# Patient Record
Sex: FEMALE | Race: WHITE
[De-identification: names, ages, dates, MRNs, and addresses within clinical notes are randomized per-mention and may not be internally consistent; named-entity substitution may affect disease eponyms.]

---

## 2019-01-16 ENCOUNTER — HOSPITAL ENCOUNTER (INPATIENT)
Dept: HOSPITAL 94 - ER | Age: 69
LOS: 5 days | Discharge: HOME | End: 2019-01-21
Payer: MEDICARE

## 2019-01-16 DIAGNOSIS — G93.41: ICD-10-CM

## 2019-01-16 DIAGNOSIS — E87.1: ICD-10-CM

## 2019-01-16 DIAGNOSIS — L03.116: Primary | ICD-10-CM

## 2019-02-16 ENCOUNTER — HOSPITAL ENCOUNTER (INPATIENT)
Dept: HOSPITAL 94 - ER | Age: 69
LOS: 6 days | Discharge: HOME | DRG: 640 | End: 2019-02-22
Attending: INTERNAL MEDICINE | Admitting: INTERNAL MEDICINE
Payer: MEDICARE

## 2019-02-16 VITALS — SYSTOLIC BLOOD PRESSURE: 112 MMHG | DIASTOLIC BLOOD PRESSURE: 70 MMHG

## 2019-02-16 VITALS — DIASTOLIC BLOOD PRESSURE: 52 MMHG | SYSTOLIC BLOOD PRESSURE: 91 MMHG

## 2019-02-16 VITALS — SYSTOLIC BLOOD PRESSURE: 95 MMHG | DIASTOLIC BLOOD PRESSURE: 50 MMHG

## 2019-02-16 VITALS — DIASTOLIC BLOOD PRESSURE: 55 MMHG | SYSTOLIC BLOOD PRESSURE: 91 MMHG

## 2019-02-16 VITALS — SYSTOLIC BLOOD PRESSURE: 90 MMHG | DIASTOLIC BLOOD PRESSURE: 56 MMHG

## 2019-02-16 VITALS — DIASTOLIC BLOOD PRESSURE: 78 MMHG | SYSTOLIC BLOOD PRESSURE: 117 MMHG

## 2019-02-16 VITALS — BODY MASS INDEX: 16.9 KG/M2 | HEIGHT: 61 IN | WEIGHT: 89.51 LBS

## 2019-02-16 VITALS — DIASTOLIC BLOOD PRESSURE: 56 MMHG | SYSTOLIC BLOOD PRESSURE: 89 MMHG

## 2019-02-16 DIAGNOSIS — G93.41: ICD-10-CM

## 2019-02-16 DIAGNOSIS — B19.20: ICD-10-CM

## 2019-02-16 DIAGNOSIS — Z79.890: ICD-10-CM

## 2019-02-16 DIAGNOSIS — E03.9: ICD-10-CM

## 2019-02-16 DIAGNOSIS — F50.9: ICD-10-CM

## 2019-02-16 DIAGNOSIS — E87.1: Primary | ICD-10-CM

## 2019-02-16 LAB
ALBUMIN SERPL BCP-MCNC: 3.1 G/DL (ref 3.4–5)
ALBUMIN SERPL BCP-MCNC: 3.5 G/DL (ref 3.4–5)
ALBUMIN/GLOB SERPL: 1.1 {RATIO} (ref 1.1–1.5)
ALP SERPL-CCNC: 68 IU/L (ref 46–116)
ALT SERPL W P-5'-P-CCNC: 151 U/L (ref 12–78)
AMPHETAMINES UR QL SCN: NEGATIVE
ANION GAP SERPL CALCULATED.3IONS-SCNC: 11 MMOL/L (ref 8–16)
ANION GAP SERPL CALCULATED.3IONS-SCNC: 12 MMOL/L (ref 8–16)
APAP SERPL-MCNC: < 2 UG/ML (ref 10–30)
APTT PPP: 32 SECONDS (ref 22–32)
AST SERPL W P-5'-P-CCNC: 572 U/L (ref 10–37)
BACTERIA URNS QL MICRO: (no result) /HPF
BARBITURATES UR QL SCN: NEGATIVE
BASOPHILS # BLD AUTO: 0 X10'3 (ref 0–0.2)
BASOPHILS NFR BLD AUTO: 1.2 % (ref 0–1)
BENZODIAZ UR QL SCN: NEGATIVE
BILIRUB SERPL-MCNC: 1.4 MG/DL (ref 0.1–1)
BUN SERPL-MCNC: 18 MG/DL (ref 7–18)
BUN SERPL-MCNC: 19 MG/DL (ref 7–18)
BUN/CREAT SERPL: 21.6 (ref 6.6–38)
BUN/CREAT SERPL: 22 (ref 6.6–38)
BURR CELLS BLD QL SMEAR: (no result)
BZE UR QL SCN: NEGATIVE
CALCIUM SERPL-MCNC: 8.3 MG/DL (ref 8.5–10.1)
CALCIUM SERPL-MCNC: 8.7 MG/DL (ref 8.5–10.1)
CANNABINOIDS UR QL SCN: NEGATIVE
CHLORIDE SERPL-SCNC: 72 MMOL/L (ref 99–107)
CHLORIDE SERPL-SCNC: 78 MMOL/L (ref 99–107)
CLARITY UR: (no result)
CO2 SERPL-SCNC: 22.1 MMOL/L (ref 24–32)
CO2 SERPL-SCNC: 22.4 MMOL/L (ref 24–32)
COLOR UR: YELLOW
CREAT SERPL-MCNC: 0.82 MG/DL (ref 0.4–0.9)
CREAT SERPL-MCNC: 0.88 MG/DL (ref 0.4–0.9)
DEPRECATED SQUAMOUS URNS QL MICRO: (no result) /LPF
EOSINOPHIL # BLD AUTO: 0 X10'3 (ref 0–0.9)
EOSINOPHIL NFR BLD AUTO: 0.1 % (ref 0–6)
ERYTHROCYTE [DISTWIDTH] IN BLOOD BY AUTOMATED COUNT: 11.8 % (ref 11.5–14.5)
ETHANOL SERPL-MCNC: < 0.01 GM/DL (ref 0–0.01)
GFR SERPL CREATININE-BSD FRML MDRD: 64 ML/MIN
GFR SERPL CREATININE-BSD FRML MDRD: 70 ML/MIN
GLUCOSE SERPL-MCNC: 117 MG/DL (ref 70–104)
GLUCOSE SERPL-MCNC: 77 MG/DL (ref 70–104)
GLUCOSE UR STRIP-MCNC: NEGATIVE MG/DL
HCT VFR BLD AUTO: 46.4 % (ref 35–45)
HGB BLD-MCNC: 15.1 G/DL (ref 12–16)
HGB UR QL STRIP: (no result)
INR PPP: 1.1 INR
KETONES UR STRIP-MCNC: (no result) MG/DL
LEUKOCYTE ESTERASE UR QL STRIP: (no result)
LYMPHOCYTES # BLD AUTO: 0.3 X10'3 (ref 1.1–4.8)
LYMPHOCYTES NFR BLD AUTO: 8 % (ref 21–51)
LYMPHOCYTES NFR BLD MANUAL: 8 % (ref 21–51)
MAGNESIUM SERPL-MCNC: 2.1 MG/DL (ref 1.5–2.4)
MCH RBC QN AUTO: 32 PG (ref 27–31)
MCHC RBC AUTO-ENTMCNC: 32.7 G/DL (ref 33–36.5)
MCV RBC AUTO: 97.8 FL (ref 78–98)
METHADONE UR QL SCN: NEGATIVE
MONOCYTES # BLD AUTO: 0.1 X10'3 (ref 0–0.9)
MONOCYTES NFR BLD AUTO: 2.2 % (ref 2–12)
MONOCYTES NFR BLD MANUAL: 3 % (ref 2–12)
NEUTROPHILS # BLD AUTO: 3.5 X10'3 (ref 1.8–7.7)
NEUTROPHILS NFR BLD AUTO: 88.5 % (ref 42–75)
NEUTS BAND # BLD MANUAL: 17 % (ref 0–10)
NEUTS SEG NFR BLD MANUAL: 72 % (ref 42–75)
NITRITE UR QL STRIP: NEGATIVE
OPIATES UR QL SCN: NEGATIVE
OSMOLALITY UR: 276 MOSM/K (ref 50–1400)
PCP UR QL SCN: NEGATIVE
PH UR STRIP: 6 [PH] (ref 4.8–8)
PLATELET # BLD AUTO: 173 X10'3 (ref 140–440)
PLATELET BLD QL SMEAR: NORMAL
PMV BLD AUTO: 8.6 FL (ref 7.4–10.4)
POTASSIUM SERPL-SCNC: 4.1 MMOL/L (ref 3.5–5.1)
POTASSIUM SERPL-SCNC: 4.2 MMOL/L (ref 3.5–5.1)
PROT SERPL-MCNC: 6.8 G/DL (ref 6.4–8.2)
PROT UR QL STRIP: 100 MG/DL
PROTHROMBIN TIME: 11 SECONDS (ref 9–12)
RBC # BLD AUTO: 4.74 X10'6 (ref 4.2–5.6)
RBC #/AREA URNS HPF: (no result) /HPF (ref 0–2)
RBC MORPH BLD: (no result)
SODIUM ?TM SUB UR QN: (no result) MEQ/L
SODIUM SERPL-SCNC: 105 MMOL/L (ref 135–145)
SODIUM SERPL-SCNC: 112 MMOL/L (ref 135–145)
SP GR UR STRIP: <=1.005 (ref 1–1.03)
TOTAL CELLS COUNTED FLD: 100
URN COLLECT METHOD CLASS: (no result)
UROBILINOGEN UR STRIP-MCNC: 0.2 E.U/DL (ref 0.2–1)
WBC # BLD AUTO: 3.9 X10'3 (ref 4.5–11)
WBC CLUMPS #/AREA URNS HPF: (no result) /HPF

## 2019-02-16 PROCEDURE — 85730 THROMBOPLASTIN TIME PARTIAL: CPT

## 2019-02-16 PROCEDURE — 83935 ASSAY OF URINE OSMOLALITY: CPT

## 2019-02-16 PROCEDURE — 82272 OCCULT BLD FECES 1-3 TESTS: CPT

## 2019-02-16 PROCEDURE — 80329 ANALGESICS NON-OPIOID 1 OR 2: CPT

## 2019-02-16 PROCEDURE — 70450 CT HEAD/BRAIN W/O DYE: CPT

## 2019-02-16 PROCEDURE — 85610 PROTHROMBIN TIME: CPT

## 2019-02-16 PROCEDURE — 87186 SC STD MICRODIL/AGAR DIL: CPT

## 2019-02-16 PROCEDURE — 80048 BASIC METABOLIC PNL TOTAL CA: CPT

## 2019-02-16 PROCEDURE — 85025 COMPLETE CBC W/AUTO DIFF WBC: CPT

## 2019-02-16 PROCEDURE — 87088 URINE BACTERIA CULTURE: CPT

## 2019-02-16 PROCEDURE — 36415 COLL VENOUS BLD VENIPUNCTURE: CPT

## 2019-02-16 PROCEDURE — 80320 DRUG SCREEN QUANTALCOHOLS: CPT

## 2019-02-16 PROCEDURE — 80053 COMPREHEN METABOLIC PANEL: CPT

## 2019-02-16 PROCEDURE — 83735 ASSAY OF MAGNESIUM: CPT

## 2019-02-16 PROCEDURE — 84443 ASSAY THYROID STIM HORMONE: CPT

## 2019-02-16 PROCEDURE — 94760 N-INVAS EAR/PLS OXIMETRY 1: CPT

## 2019-02-16 PROCEDURE — 71045 X-RAY EXAM CHEST 1 VIEW: CPT

## 2019-02-16 PROCEDURE — 93005 ELECTROCARDIOGRAM TRACING: CPT

## 2019-02-16 PROCEDURE — 87070 CULTURE OTHR SPECIMN AEROBIC: CPT

## 2019-02-16 PROCEDURE — 84300 ASSAY OF URINE SODIUM: CPT

## 2019-02-16 PROCEDURE — 81001 URINALYSIS AUTO W/SCOPE: CPT

## 2019-02-16 PROCEDURE — 80305 DRUG TEST PRSMV DIR OPT OBS: CPT

## 2019-02-16 PROCEDURE — 82948 REAGENT STRIP/BLOOD GLUCOSE: CPT

## 2019-02-16 PROCEDURE — 84100 ASSAY OF PHOSPHORUS: CPT

## 2019-02-16 PROCEDURE — 99291 CRITICAL CARE FIRST HOUR: CPT

## 2019-02-16 PROCEDURE — 87077 CULTURE AEROBIC IDENTIFY: CPT

## 2019-02-16 RX ADMIN — SODIUM CHLORIDE SCH MLS/HR: 3 INJECTION, SOLUTION INTRAVENOUS at 15:46

## 2019-02-16 RX ADMIN — SODIUM CHLORIDE SCH MLS/HR: 9 INJECTION INTRAMUSCULAR; INTRAVENOUS; SUBCUTANEOUS at 18:45

## 2019-02-16 RX ADMIN — HEPARIN SODIUM SCH UNIT: 5000 INJECTION, SOLUTION INTRAVENOUS; SUBCUTANEOUS at 21:41

## 2019-02-16 NOTE — NUR
Problems reprioritized. Patient report given, questions answered & plan of care reviewed 
with Kinga LANGE.

## 2019-02-16 NOTE — NUR
Patient arrived to floor via gurney and transferred to hospital bed. Patient appears guarded 
and denice her hands, alert to self and location.  Two RN skin check performed with 
Keturah Perdomo RN with multiple skin issues: Non-blanchable redness on sacrum, multiple skin 
tears, and ecchymosis throughout.  Wound care pictures taken and foam dressings placed.  IV 
synthroid not available; pharmacy to bring up.  Weller cath placed with no issues, 350ml 
immediately out and sample sent down to lab.  Right PIV infusing 3% NSS at 50ml/h. Patient 
oriented to room with  at bedside.

## 2019-02-16 NOTE — NUR
ATTEMPTED MULTIPLE EKG'S ON PATIENT IN RM 19, PT WAS SHAKY AND COULDN'T GET A 
GOOD READ, TOO MUCH ARTIFACT TO READ AN EKG, STILL PRINTED AN EKG AT 1312

## 2019-02-16 NOTE — NUR
MIKIE CHARGE NURSE GIVEN PT CHARTE AND INFORMED PT NEEDS TO COME STRAIGHT BACK 
AFTER EKG DONE IN BED 19

## 2019-02-16 NOTE — NUR
RN Note -MD Communication



 relating concerns regarding stool tests and and follow-up colonoscopy when Nhi Vieira arrived at bedside and he was able to relay his questions directly to her.

## 2019-02-17 VITALS — DIASTOLIC BLOOD PRESSURE: 61 MMHG | SYSTOLIC BLOOD PRESSURE: 102 MMHG

## 2019-02-17 VITALS — SYSTOLIC BLOOD PRESSURE: 81 MMHG | DIASTOLIC BLOOD PRESSURE: 52 MMHG

## 2019-02-17 VITALS — DIASTOLIC BLOOD PRESSURE: 51 MMHG | SYSTOLIC BLOOD PRESSURE: 89 MMHG

## 2019-02-17 VITALS — DIASTOLIC BLOOD PRESSURE: 48 MMHG | SYSTOLIC BLOOD PRESSURE: 83 MMHG

## 2019-02-17 VITALS — DIASTOLIC BLOOD PRESSURE: 62 MMHG | SYSTOLIC BLOOD PRESSURE: 105 MMHG

## 2019-02-17 VITALS — SYSTOLIC BLOOD PRESSURE: 96 MMHG | DIASTOLIC BLOOD PRESSURE: 54 MMHG

## 2019-02-17 VITALS — SYSTOLIC BLOOD PRESSURE: 95 MMHG | DIASTOLIC BLOOD PRESSURE: 47 MMHG

## 2019-02-17 VITALS — SYSTOLIC BLOOD PRESSURE: 85 MMHG | DIASTOLIC BLOOD PRESSURE: 50 MMHG

## 2019-02-17 VITALS — DIASTOLIC BLOOD PRESSURE: 50 MMHG | SYSTOLIC BLOOD PRESSURE: 81 MMHG

## 2019-02-17 VITALS — DIASTOLIC BLOOD PRESSURE: 64 MMHG | SYSTOLIC BLOOD PRESSURE: 104 MMHG

## 2019-02-17 VITALS — SYSTOLIC BLOOD PRESSURE: 80 MMHG | DIASTOLIC BLOOD PRESSURE: 44 MMHG

## 2019-02-17 VITALS — DIASTOLIC BLOOD PRESSURE: 58 MMHG | SYSTOLIC BLOOD PRESSURE: 98 MMHG

## 2019-02-17 VITALS — DIASTOLIC BLOOD PRESSURE: 53 MMHG | SYSTOLIC BLOOD PRESSURE: 94 MMHG

## 2019-02-17 VITALS — DIASTOLIC BLOOD PRESSURE: 68 MMHG | SYSTOLIC BLOOD PRESSURE: 111 MMHG

## 2019-02-17 VITALS — SYSTOLIC BLOOD PRESSURE: 90 MMHG | DIASTOLIC BLOOD PRESSURE: 50 MMHG

## 2019-02-17 VITALS — DIASTOLIC BLOOD PRESSURE: 71 MMHG | SYSTOLIC BLOOD PRESSURE: 105 MMHG

## 2019-02-17 VITALS — DIASTOLIC BLOOD PRESSURE: 57 MMHG | SYSTOLIC BLOOD PRESSURE: 87 MMHG

## 2019-02-17 VITALS — SYSTOLIC BLOOD PRESSURE: 99 MMHG | DIASTOLIC BLOOD PRESSURE: 59 MMHG

## 2019-02-17 VITALS — DIASTOLIC BLOOD PRESSURE: 56 MMHG | SYSTOLIC BLOOD PRESSURE: 100 MMHG

## 2019-02-17 VITALS — SYSTOLIC BLOOD PRESSURE: 107 MMHG | DIASTOLIC BLOOD PRESSURE: 76 MMHG

## 2019-02-17 VITALS — DIASTOLIC BLOOD PRESSURE: 51 MMHG | SYSTOLIC BLOOD PRESSURE: 98 MMHG

## 2019-02-17 VITALS — DIASTOLIC BLOOD PRESSURE: 63 MMHG | SYSTOLIC BLOOD PRESSURE: 103 MMHG

## 2019-02-17 LAB
ALBUMIN SERPL BCP-MCNC: 2.5 G/DL (ref 3.4–5)
ALBUMIN SERPL BCP-MCNC: 2.7 G/DL (ref 3.4–5)
ANION GAP SERPL CALCULATED.3IONS-SCNC: 10 MMOL/L (ref 8–16)
ANION GAP SERPL CALCULATED.3IONS-SCNC: 10 MMOL/L (ref 8–16)
ANION GAP SERPL CALCULATED.3IONS-SCNC: 12 MMOL/L (ref 8–16)
ANION GAP SERPL CALCULATED.3IONS-SCNC: 9 MMOL/L (ref 8–16)
BASOPHILS # BLD AUTO: 0.1 X10'3 (ref 0–0.2)
BASOPHILS NFR BLD AUTO: 1 % (ref 0–1)
BUN SERPL-MCNC: 16 MG/DL (ref 7–18)
BUN SERPL-MCNC: 16 MG/DL (ref 7–18)
BUN SERPL-MCNC: 17 MG/DL (ref 7–18)
BUN SERPL-MCNC: 17 MG/DL (ref 7–18)
BUN/CREAT SERPL: 23.9 (ref 6.6–38)
BUN/CREAT SERPL: 23.9 (ref 6.6–38)
BUN/CREAT SERPL: 27.1 (ref 6.6–38)
BUN/CREAT SERPL: 27.9 (ref 6.6–38)
CALCIUM SERPL-MCNC: 7.4 MG/DL (ref 8.5–10.1)
CALCIUM SERPL-MCNC: 7.7 MG/DL (ref 8.5–10.1)
CALCIUM SERPL-MCNC: 7.9 MG/DL (ref 8.5–10.1)
CALCIUM SERPL-MCNC: 7.9 MG/DL (ref 8.5–10.1)
CHLORIDE SERPL-SCNC: 82 MMOL/L (ref 99–107)
CHLORIDE SERPL-SCNC: 82 MMOL/L (ref 99–107)
CHLORIDE SERPL-SCNC: 84 MMOL/L (ref 99–107)
CHLORIDE SERPL-SCNC: 87 MMOL/L (ref 99–107)
CO2 SERPL-SCNC: 21.1 MMOL/L (ref 24–32)
CO2 SERPL-SCNC: 21.2 MMOL/L (ref 24–32)
CO2 SERPL-SCNC: 21.6 MMOL/L (ref 24–32)
CO2 SERPL-SCNC: 22.1 MMOL/L (ref 24–32)
CREAT SERPL-MCNC: 0.59 MG/DL (ref 0.4–0.9)
CREAT SERPL-MCNC: 0.61 MG/DL (ref 0.4–0.9)
CREAT SERPL-MCNC: 0.67 MG/DL (ref 0.4–0.9)
CREAT SERPL-MCNC: 0.71 MG/DL (ref 0.4–0.9)
EOSINOPHIL # BLD AUTO: 0 X10'3 (ref 0–0.9)
EOSINOPHIL NFR BLD AUTO: 0.1 % (ref 0–6)
ERYTHROCYTE [DISTWIDTH] IN BLOOD BY AUTOMATED COUNT: 12 % (ref 11.5–14.5)
GFR SERPL CREATININE-BSD FRML MDRD: 82 ML/MIN
GFR SERPL CREATININE-BSD FRML MDRD: 88 ML/MIN
GFR SERPL CREATININE-BSD FRML MDRD: > 90 ML/MIN
GFR SERPL CREATININE-BSD FRML MDRD: > 90 ML/MIN
GLUCOSE SERPL-MCNC: 69 MG/DL (ref 70–104)
GLUCOSE SERPL-MCNC: 71 MG/DL (ref 70–104)
GLUCOSE SERPL-MCNC: 72 MG/DL (ref 70–104)
GLUCOSE SERPL-MCNC: 76 MG/DL (ref 70–104)
HCT VFR BLD AUTO: 42.2 % (ref 35–45)
HGB BLD-MCNC: 14 G/DL (ref 12–16)
LYMPHOCYTES # BLD AUTO: 0.2 X10'3 (ref 1.1–4.8)
LYMPHOCYTES NFR BLD AUTO: 4.2 % (ref 21–51)
MCH RBC QN AUTO: 32.3 PG (ref 27–31)
MCHC RBC AUTO-ENTMCNC: 33.1 G/DL (ref 33–36.5)
MCV RBC AUTO: 97.5 FL (ref 78–98)
MONOCYTES # BLD AUTO: 0.1 X10'3 (ref 0–0.9)
MONOCYTES NFR BLD AUTO: 2 % (ref 2–12)
NEUTROPHILS # BLD AUTO: 4.9 X10'3 (ref 1.8–7.7)
NEUTROPHILS NFR BLD AUTO: 92.7 % (ref 42–75)
PLATELET # BLD AUTO: 144 X10'3 (ref 140–440)
PMV BLD AUTO: 9 FL (ref 7.4–10.4)
POTASSIUM SERPL-SCNC: 3.9 MMOL/L (ref 3.5–5.1)
POTASSIUM SERPL-SCNC: 4.2 MMOL/L (ref 3.5–5.1)
POTASSIUM SERPL-SCNC: 4.4 MMOL/L (ref 3.5–5.1)
POTASSIUM SERPL-SCNC: 4.6 MMOL/L (ref 3.5–5.1)
RBC # BLD AUTO: 4.33 X10'6 (ref 4.2–5.6)
SODIUM SERPL-SCNC: 113 MMOL/L (ref 135–145)
SODIUM SERPL-SCNC: 115 MMOL/L (ref 135–145)
SODIUM SERPL-SCNC: 115 MMOL/L (ref 135–145)
SODIUM SERPL-SCNC: 119 MMOL/L (ref 135–145)
WBC # BLD AUTO: 5.3 X10'3 (ref 4.5–11)

## 2019-02-17 RX ADMIN — SIMETHICONE SCH MG: 20 SUSPENSION/ DROPS ORAL at 08:00

## 2019-02-17 RX ADMIN — HEPARIN SODIUM SCH UNIT: 5000 INJECTION, SOLUTION INTRAVENOUS; SUBCUTANEOUS at 08:32

## 2019-02-17 RX ADMIN — SODIUM CHLORIDE SCH MLS/HR: 3 INJECTION, SOLUTION INTRAVENOUS at 11:20

## 2019-02-17 RX ADMIN — SIMETHICONE SCH MG: 20 SUSPENSION/ DROPS ORAL at 13:00

## 2019-02-17 RX ADMIN — SIMETHICONE SCH MG: 20 SUSPENSION/ DROPS ORAL at 21:00

## 2019-02-17 RX ADMIN — HEPARIN SODIUM SCH UNIT: 5000 INJECTION, SOLUTION INTRAVENOUS; SUBCUTANEOUS at 20:51

## 2019-02-17 RX ADMIN — SODIUM CHLORIDE SCH MLS/HR: 3 INJECTION, SOLUTION INTRAVENOUS at 01:20

## 2019-02-17 RX ADMIN — SODIUM CHLORIDE SCH MLS/HR: 9 INJECTION INTRAMUSCULAR; INTRAVENOUS; SUBCUTANEOUS at 11:01

## 2019-02-17 RX ADMIN — PANTOPRAZOLE SODIUM SCH MG: 40 TABLET, DELAYED RELEASE ORAL at 07:30

## 2019-02-17 NOTE — NUR
RN Note -Appetite/education



Have tried to explain to pt that she needs to decrease her water intake or she will continue 
to lose sodium and end up back in the hospital. Also explained to pt that she needs to eat. 
Pt said that she is nauseous. Gave Zofran. Pt still seemed overall reluctant to eat and only 
wanted water. Told pt that she could have a glass of water if she ate some food. She did 
finally eat less than 25% of her dinner. 



Sent down meal request for tomorrow that included foods she and her  said she likes.

## 2019-02-17 NOTE — NUR
Patient in room ICU 2040. I have received report from  NAZARIO Nolasco and had the opportunity to 
ask questions and assume patient care.

## 2019-02-17 NOTE — NUR
RN Note -MD Communication



Called Chance Cheung regarding simethicone home med reported by . Order received and 
entered in EMR.

## 2019-02-17 NOTE — NUR
Problems reprioritized. Patient report given, questions answered & plan of care reviewed 
with Gaurav RN.

## 2019-02-18 VITALS — DIASTOLIC BLOOD PRESSURE: 75 MMHG | SYSTOLIC BLOOD PRESSURE: 148 MMHG

## 2019-02-18 VITALS — SYSTOLIC BLOOD PRESSURE: 109 MMHG | DIASTOLIC BLOOD PRESSURE: 60 MMHG

## 2019-02-18 VITALS — SYSTOLIC BLOOD PRESSURE: 116 MMHG | DIASTOLIC BLOOD PRESSURE: 80 MMHG

## 2019-02-18 VITALS — DIASTOLIC BLOOD PRESSURE: 58 MMHG | SYSTOLIC BLOOD PRESSURE: 106 MMHG

## 2019-02-18 VITALS — SYSTOLIC BLOOD PRESSURE: 124 MMHG | DIASTOLIC BLOOD PRESSURE: 66 MMHG

## 2019-02-18 VITALS — SYSTOLIC BLOOD PRESSURE: 142 MMHG | DIASTOLIC BLOOD PRESSURE: 74 MMHG

## 2019-02-18 VITALS — SYSTOLIC BLOOD PRESSURE: 118 MMHG | DIASTOLIC BLOOD PRESSURE: 68 MMHG

## 2019-02-18 VITALS — DIASTOLIC BLOOD PRESSURE: 64 MMHG | SYSTOLIC BLOOD PRESSURE: 133 MMHG

## 2019-02-18 VITALS — SYSTOLIC BLOOD PRESSURE: 147 MMHG | DIASTOLIC BLOOD PRESSURE: 75 MMHG

## 2019-02-18 VITALS — SYSTOLIC BLOOD PRESSURE: 131 MMHG | DIASTOLIC BLOOD PRESSURE: 62 MMHG

## 2019-02-18 VITALS — SYSTOLIC BLOOD PRESSURE: 112 MMHG | DIASTOLIC BLOOD PRESSURE: 58 MMHG

## 2019-02-18 VITALS — SYSTOLIC BLOOD PRESSURE: 102 MMHG | DIASTOLIC BLOOD PRESSURE: 54 MMHG

## 2019-02-18 VITALS — SYSTOLIC BLOOD PRESSURE: 116 MMHG | DIASTOLIC BLOOD PRESSURE: 66 MMHG

## 2019-02-18 VITALS — DIASTOLIC BLOOD PRESSURE: 75 MMHG | SYSTOLIC BLOOD PRESSURE: 159 MMHG

## 2019-02-18 VITALS — DIASTOLIC BLOOD PRESSURE: 56 MMHG | SYSTOLIC BLOOD PRESSURE: 110 MMHG

## 2019-02-18 VITALS — DIASTOLIC BLOOD PRESSURE: 74 MMHG | SYSTOLIC BLOOD PRESSURE: 139 MMHG

## 2019-02-18 VITALS — DIASTOLIC BLOOD PRESSURE: 60 MMHG | SYSTOLIC BLOOD PRESSURE: 136 MMHG

## 2019-02-18 VITALS — DIASTOLIC BLOOD PRESSURE: 60 MMHG | SYSTOLIC BLOOD PRESSURE: 116 MMHG

## 2019-02-18 VITALS — DIASTOLIC BLOOD PRESSURE: 65 MMHG | SYSTOLIC BLOOD PRESSURE: 124 MMHG

## 2019-02-18 VITALS — SYSTOLIC BLOOD PRESSURE: 115 MMHG | DIASTOLIC BLOOD PRESSURE: 74 MMHG

## 2019-02-18 VITALS — DIASTOLIC BLOOD PRESSURE: 76 MMHG | SYSTOLIC BLOOD PRESSURE: 151 MMHG

## 2019-02-18 VITALS — SYSTOLIC BLOOD PRESSURE: 146 MMHG | DIASTOLIC BLOOD PRESSURE: 76 MMHG

## 2019-02-18 VITALS — SYSTOLIC BLOOD PRESSURE: 107 MMHG | DIASTOLIC BLOOD PRESSURE: 64 MMHG

## 2019-02-18 VITALS — SYSTOLIC BLOOD PRESSURE: 143 MMHG | DIASTOLIC BLOOD PRESSURE: 77 MMHG

## 2019-02-18 LAB
ALBUMIN SERPL BCP-MCNC: 2.1 G/DL (ref 3.4–5)
ALBUMIN SERPL BCP-MCNC: 2.3 G/DL (ref 3.4–5)
ALBUMIN SERPL BCP-MCNC: 2.5 G/DL (ref 3.4–5)
ALBUMIN SERPL BCP-MCNC: 2.6 G/DL (ref 3.4–5)
ALBUMIN SERPL BCP-MCNC: 2.8 G/DL (ref 3.4–5)
ALBUMIN/GLOB SERPL: 0.9 {RATIO} (ref 1.1–1.5)
ALP SERPL-CCNC: 62 IU/L (ref 46–116)
ALT SERPL W P-5'-P-CCNC: 194 U/L (ref 12–78)
ANION GAP SERPL CALCULATED.3IONS-SCNC: 11 MMOL/L (ref 8–16)
ANION GAP SERPL CALCULATED.3IONS-SCNC: 8 MMOL/L (ref 8–16)
ANION GAP SERPL CALCULATED.3IONS-SCNC: 9 MMOL/L (ref 8–16)
AST SERPL W P-5'-P-CCNC: 623 U/L (ref 10–37)
BASOPHILS # BLD AUTO: 0.1 X10'3 (ref 0–0.2)
BASOPHILS NFR BLD AUTO: 1.3 % (ref 0–1)
BILIRUB SERPL-MCNC: 0.9 MG/DL (ref 0.1–1)
BUN SERPL-MCNC: 15 MG/DL (ref 7–18)
BUN SERPL-MCNC: 16 MG/DL (ref 7–18)
BUN SERPL-MCNC: 17 MG/DL (ref 7–18)
BUN/CREAT SERPL: 26.2 (ref 6.6–38)
BUN/CREAT SERPL: 27.1 (ref 6.6–38)
BUN/CREAT SERPL: 29.4 (ref 6.6–38)
BUN/CREAT SERPL: 29.8 (ref 6.6–38)
BUN/CREAT SERPL: 31.4 (ref 6.6–38)
CALCIUM SERPL-MCNC: 6.8 MG/DL (ref 8.5–10.1)
CALCIUM SERPL-MCNC: 7.6 MG/DL (ref 8.5–10.1)
CALCIUM SERPL-MCNC: 7.9 MG/DL (ref 8.5–10.1)
CALCIUM SERPL-MCNC: 8 MG/DL (ref 8.5–10.1)
CALCIUM SERPL-MCNC: 8.3 MG/DL (ref 8.5–10.1)
CHLORIDE SERPL-SCNC: 91 MMOL/L (ref 99–107)
CHLORIDE SERPL-SCNC: 92 MMOL/L (ref 99–107)
CHLORIDE SERPL-SCNC: 93 MMOL/L (ref 99–107)
CHLORIDE SERPL-SCNC: 95 MMOL/L (ref 99–107)
CHLORIDE SERPL-SCNC: 95 MMOL/L (ref 99–107)
CO2 SERPL-SCNC: 17.7 MMOL/L (ref 24–32)
CO2 SERPL-SCNC: 18.6 MMOL/L (ref 24–32)
CO2 SERPL-SCNC: 22 MMOL/L (ref 24–32)
CO2 SERPL-SCNC: 23.1 MMOL/L (ref 24–32)
CO2 SERPL-SCNC: 23.5 MMOL/L (ref 24–32)
CREAT SERPL-MCNC: 0.51 MG/DL (ref 0.4–0.9)
CREAT SERPL-MCNC: 0.51 MG/DL (ref 0.4–0.9)
CREAT SERPL-MCNC: 0.57 MG/DL (ref 0.4–0.9)
CREAT SERPL-MCNC: 0.59 MG/DL (ref 0.4–0.9)
CREAT SERPL-MCNC: 0.61 MG/DL (ref 0.4–0.9)
EOSINOPHIL # BLD AUTO: 0 X10'3 (ref 0–0.9)
EOSINOPHIL NFR BLD AUTO: 0.1 % (ref 0–6)
EOSINOPHIL NFR BLD MANUAL: 1 % (ref 0–6)
ERYTHROCYTE [DISTWIDTH] IN BLOOD BY AUTOMATED COUNT: 12.1 % (ref 11.5–14.5)
GFR SERPL CREATININE-BSD FRML MDRD: > 90 ML/MIN
GLUCOSE SERPL-MCNC: 142 MG/DL (ref 70–104)
GLUCOSE SERPL-MCNC: 56 MG/DL (ref 70–104)
GLUCOSE SERPL-MCNC: 62 MG/DL (ref 70–104)
GLUCOSE SERPL-MCNC: 79 MG/DL (ref 70–104)
GLUCOSE SERPL-MCNC: 89 MG/DL (ref 70–104)
HCT VFR BLD AUTO: 37.3 % (ref 35–45)
HGB BLD-MCNC: 12.8 G/DL (ref 12–16)
LYMPHOCYTES # BLD AUTO: 0.5 X10'3 (ref 1.1–4.8)
LYMPHOCYTES NFR BLD AUTO: 7 % (ref 21–51)
LYMPHOCYTES NFR BLD MANUAL: 11 % (ref 21–51)
MAGNESIUM SERPL-MCNC: 2 MG/DL (ref 1.5–2.4)
MCH RBC QN AUTO: 33.4 PG (ref 27–31)
MCHC RBC AUTO-ENTMCNC: 34.3 G/DL (ref 33–36.5)
MCV RBC AUTO: 97.4 FL (ref 78–98)
MONOCYTES # BLD AUTO: 0.3 X10'3 (ref 0–0.9)
MONOCYTES NFR BLD AUTO: 4.5 % (ref 2–12)
MONOCYTES NFR BLD MANUAL: 5 % (ref 2–12)
NEUTROPHILS # BLD AUTO: 5.5 X10'3 (ref 1.8–7.7)
NEUTROPHILS NFR BLD AUTO: 87.1 % (ref 42–75)
NEUTS BAND # BLD MANUAL: 10 % (ref 0–10)
NEUTS SEG NFR BLD MANUAL: 73 % (ref 42–75)
OSMOLALITY UR: 192 MOSM/K (ref 50–1400)
PHOSPHATE SERPL-MCNC: 2.3 MG/DL (ref 2.3–4.5)
PLATELET # BLD AUTO: 142 X10'3 (ref 140–440)
PLATELET BLD QL SMEAR: (no result)
PMV BLD AUTO: 9.9 FL (ref 7.4–10.4)
POTASSIUM SERPL-SCNC: 3.5 MMOL/L (ref 3.5–5.1)
POTASSIUM SERPL-SCNC: 3.6 MMOL/L (ref 3.5–5.1)
POTASSIUM SERPL-SCNC: 3.9 MMOL/L (ref 3.5–5.1)
POTASSIUM SERPL-SCNC: 4 MMOL/L (ref 3.5–5.1)
POTASSIUM SERPL-SCNC: 4.2 MMOL/L (ref 3.5–5.1)
PROT SERPL-MCNC: 6 G/DL (ref 6.4–8.2)
RBC # BLD AUTO: 3.83 X10'6 (ref 4.2–5.6)
RBC MORPH BLD: NORMAL
SODIUM ?TM SUB UR QN: < 15 MEQ/L
SODIUM SERPL-SCNC: 123 MMOL/L (ref 135–145)
SODIUM SERPL-SCNC: 124 MMOL/L (ref 135–145)
SODIUM SERPL-SCNC: 124 MMOL/L (ref 135–145)
TOTAL CELLS COUNTED FLD: 100
WBC # BLD AUTO: 6.4 X10'3 (ref 4.5–11)

## 2019-02-18 RX ADMIN — SIMETHICONE SCH MG: 20 SUSPENSION/ DROPS ORAL at 07:47

## 2019-02-18 RX ADMIN — SIMETHICONE SCH MG: 20 SUSPENSION/ DROPS ORAL at 21:11

## 2019-02-18 RX ADMIN — HEPARIN SODIUM SCH UNIT: 5000 INJECTION, SOLUTION INTRAVENOUS; SUBCUTANEOUS at 21:10

## 2019-02-18 RX ADMIN — PANTOPRAZOLE SODIUM SCH MG: 40 TABLET, DELAYED RELEASE ORAL at 07:45

## 2019-02-18 RX ADMIN — LEVOTHYROXINE SODIUM SCH MCG: 100 TABLET ORAL at 07:45

## 2019-02-18 RX ADMIN — SODIUM CHLORIDE SCH MLS/HR: 9 INJECTION INTRAMUSCULAR; INTRAVENOUS; SUBCUTANEOUS at 00:35

## 2019-02-18 RX ADMIN — Medication PRN G: at 17:32

## 2019-02-18 RX ADMIN — HEPARIN SODIUM SCH UNIT: 5000 INJECTION, SOLUTION INTRAVENOUS; SUBCUTANEOUS at 07:46

## 2019-02-18 RX ADMIN — Medication SCH MMU: at 21:11

## 2019-02-18 RX ADMIN — SIMETHICONE SCH MG: 20 SUSPENSION/ DROPS ORAL at 13:39

## 2019-02-18 NOTE — NUR
Patient in room ICU 2040. I have received report from  [facility]  and had the opportunity to ask 
questions and assume patient care.

## 2019-02-18 NOTE — NUR
Patient in room ICU 2040. I have received report from [facility] and had the opportunity to ask 
questions and assume patient care.

## 2019-02-18 NOTE — NUR
Prolonged QT 0.68, Accu check this am 44, amp D5 given for a corrected BGL of 125, Na 124. 
alerted Dr Hernandez of changes. Specific order in place to alert MD if Na level >3 from 
previous level. Changed IV fluid to 1/2 NS. Patient confused, repeatedly asking when she can 
go home.  now at bedside.

## 2019-02-18 NOTE — NUR
RN Note -MD Communication



Notified Chance Cheung of drop in pt's urine output and increase in peripheral edema and 
blood pressure.



Also asked for order for  consult and psych eval because pt's low sodium and 
malnourishment appears to be caused by pt refusing to eat and compulsive water intake, which 
 is unable to control. Pt education is completely ineffective, as pt is irrational 
and either unable or unwilling to change her behavior. Review of pt previous admissions 
shows that current problem appears to have started last year when pt's mother  and she 
became depressed. Pt also told day shift RN that she had been diagnosed with eating disorder 
in childhood.

## 2019-02-19 VITALS — SYSTOLIC BLOOD PRESSURE: 138 MMHG | DIASTOLIC BLOOD PRESSURE: 70 MMHG

## 2019-02-19 VITALS — DIASTOLIC BLOOD PRESSURE: 73 MMHG | SYSTOLIC BLOOD PRESSURE: 143 MMHG

## 2019-02-19 VITALS — DIASTOLIC BLOOD PRESSURE: 78 MMHG | SYSTOLIC BLOOD PRESSURE: 152 MMHG

## 2019-02-19 VITALS — DIASTOLIC BLOOD PRESSURE: 73 MMHG | SYSTOLIC BLOOD PRESSURE: 136 MMHG

## 2019-02-19 VITALS — SYSTOLIC BLOOD PRESSURE: 155 MMHG | DIASTOLIC BLOOD PRESSURE: 83 MMHG

## 2019-02-19 VITALS — DIASTOLIC BLOOD PRESSURE: 63 MMHG | SYSTOLIC BLOOD PRESSURE: 121 MMHG

## 2019-02-19 VITALS — DIASTOLIC BLOOD PRESSURE: 74 MMHG | SYSTOLIC BLOOD PRESSURE: 144 MMHG

## 2019-02-19 VITALS — SYSTOLIC BLOOD PRESSURE: 132 MMHG | DIASTOLIC BLOOD PRESSURE: 70 MMHG

## 2019-02-19 VITALS — DIASTOLIC BLOOD PRESSURE: 82 MMHG | SYSTOLIC BLOOD PRESSURE: 159 MMHG

## 2019-02-19 VITALS — DIASTOLIC BLOOD PRESSURE: 85 MMHG | SYSTOLIC BLOOD PRESSURE: 163 MMHG

## 2019-02-19 VITALS — SYSTOLIC BLOOD PRESSURE: 149 MMHG | DIASTOLIC BLOOD PRESSURE: 70 MMHG

## 2019-02-19 VITALS — SYSTOLIC BLOOD PRESSURE: 161 MMHG | DIASTOLIC BLOOD PRESSURE: 71 MMHG

## 2019-02-19 VITALS — DIASTOLIC BLOOD PRESSURE: 66 MMHG | SYSTOLIC BLOOD PRESSURE: 140 MMHG

## 2019-02-19 VITALS — SYSTOLIC BLOOD PRESSURE: 133 MMHG | DIASTOLIC BLOOD PRESSURE: 64 MMHG

## 2019-02-19 VITALS — DIASTOLIC BLOOD PRESSURE: 79 MMHG | SYSTOLIC BLOOD PRESSURE: 145 MMHG

## 2019-02-19 VITALS — DIASTOLIC BLOOD PRESSURE: 70 MMHG | SYSTOLIC BLOOD PRESSURE: 134 MMHG

## 2019-02-19 VITALS — DIASTOLIC BLOOD PRESSURE: 81 MMHG | SYSTOLIC BLOOD PRESSURE: 165 MMHG

## 2019-02-19 VITALS — SYSTOLIC BLOOD PRESSURE: 142 MMHG | DIASTOLIC BLOOD PRESSURE: 76 MMHG

## 2019-02-19 VITALS — DIASTOLIC BLOOD PRESSURE: 73 MMHG | SYSTOLIC BLOOD PRESSURE: 139 MMHG

## 2019-02-19 LAB
ALBUMIN SERPL BCP-MCNC: 2.5 G/DL (ref 3.4–5)
ALBUMIN SERPL BCP-MCNC: 2.7 G/DL (ref 3.4–5)
ALBUMIN SERPL BCP-MCNC: 2.8 G/DL (ref 3.4–5)
ALBUMIN SERPL BCP-MCNC: 2.8 G/DL (ref 3.4–5)
ANION GAP SERPL CALCULATED.3IONS-SCNC: 10 MMOL/L (ref 8–16)
ANION GAP SERPL CALCULATED.3IONS-SCNC: 10 MMOL/L (ref 8–16)
ANION GAP SERPL CALCULATED.3IONS-SCNC: 7 MMOL/L (ref 8–16)
ANION GAP SERPL CALCULATED.3IONS-SCNC: 7 MMOL/L (ref 8–16)
BASOPHILS # BLD AUTO: 0 X10'3 (ref 0–0.2)
BASOPHILS NFR BLD AUTO: 0.2 % (ref 0–1)
BUN SERPL-MCNC: 12 MG/DL (ref 7–18)
BUN SERPL-MCNC: 12 MG/DL (ref 7–18)
BUN SERPL-MCNC: 13 MG/DL (ref 7–18)
BUN SERPL-MCNC: 13 MG/DL (ref 7–18)
BUN/CREAT SERPL: 25.5 (ref 6.6–38)
BUN/CREAT SERPL: 26.5 (ref 6.6–38)
BUN/CREAT SERPL: 27.3 (ref 6.6–38)
BUN/CREAT SERPL: 28.9 (ref 6.6–38)
CALCIUM SERPL-MCNC: 7.8 MG/DL (ref 8.5–10.1)
CALCIUM SERPL-MCNC: 7.9 MG/DL (ref 8.5–10.1)
CALCIUM SERPL-MCNC: 8.1 MG/DL (ref 8.5–10.1)
CALCIUM SERPL-MCNC: 8.1 MG/DL (ref 8.5–10.1)
CHLORIDE SERPL-SCNC: 93 MMOL/L (ref 99–107)
CHLORIDE SERPL-SCNC: 95 MMOL/L (ref 99–107)
CHLORIDE SERPL-SCNC: 96 MMOL/L (ref 99–107)
CHLORIDE SERPL-SCNC: 98 MMOL/L (ref 99–107)
CO2 SERPL-SCNC: 22.6 MMOL/L (ref 24–32)
CO2 SERPL-SCNC: 22.6 MMOL/L (ref 24–32)
CO2 SERPL-SCNC: 23.9 MMOL/L (ref 24–32)
CO2 SERPL-SCNC: 24.6 MMOL/L (ref 24–32)
CREAT SERPL-MCNC: 0.44 MG/DL (ref 0.4–0.9)
CREAT SERPL-MCNC: 0.45 MG/DL (ref 0.4–0.9)
CREAT SERPL-MCNC: 0.47 MG/DL (ref 0.4–0.9)
CREAT SERPL-MCNC: 0.49 MG/DL (ref 0.4–0.9)
EOSINOPHIL # BLD AUTO: 0.1 X10'3 (ref 0–0.9)
EOSINOPHIL NFR BLD AUTO: 1.1 % (ref 0–6)
ERYTHROCYTE [DISTWIDTH] IN BLOOD BY AUTOMATED COUNT: 13.3 % (ref 11.5–14.5)
GFR SERPL CREATININE-BSD FRML MDRD: > 90 ML/MIN
GLUCOSE SERPL-MCNC: 101 MG/DL (ref 70–104)
GLUCOSE SERPL-MCNC: 75 MG/DL (ref 70–104)
GLUCOSE SERPL-MCNC: 85 MG/DL (ref 70–104)
GLUCOSE SERPL-MCNC: 88 MG/DL (ref 70–104)
HCT VFR BLD AUTO: 38 % (ref 35–45)
HGB BLD-MCNC: 12.9 G/DL (ref 12–16)
LYMPHOCYTES # BLD AUTO: 0.5 X10'3 (ref 1.1–4.8)
LYMPHOCYTES NFR BLD AUTO: 8.8 % (ref 21–51)
MCH RBC QN AUTO: 32.1 PG (ref 27–31)
MCHC RBC AUTO-ENTMCNC: 33.9 G/DL (ref 33–36.5)
MCV RBC AUTO: 94.7 FL (ref 78–98)
MONOCYTES # BLD AUTO: 0.2 X10'3 (ref 0–0.9)
MONOCYTES NFR BLD AUTO: 3.7 % (ref 2–12)
NEUTROPHILS # BLD AUTO: 5 X10'3 (ref 1.8–7.7)
NEUTROPHILS NFR BLD AUTO: 86.2 % (ref 42–75)
PLATELET # BLD AUTO: 138 X10'3 (ref 140–440)
PMV BLD AUTO: 9.1 FL (ref 7.4–10.4)
POTASSIUM SERPL-SCNC: 3.5 MMOL/L (ref 3.5–5.1)
POTASSIUM SERPL-SCNC: 3.7 MMOL/L (ref 3.5–5.1)
POTASSIUM SERPL-SCNC: 3.8 MMOL/L (ref 3.5–5.1)
POTASSIUM SERPL-SCNC: 3.9 MMOL/L (ref 3.5–5.1)
RBC # BLD AUTO: 4.01 X10'6 (ref 4.2–5.6)
SODIUM SERPL-SCNC: 126 MMOL/L (ref 135–145)
SODIUM SERPL-SCNC: 128 MMOL/L (ref 135–145)
SODIUM SERPL-SCNC: 128 MMOL/L (ref 135–145)
SODIUM SERPL-SCNC: 129 MMOL/L (ref 135–145)
WBC # BLD AUTO: 5.8 X10'3 (ref 4.5–11)

## 2019-02-19 RX ADMIN — Medication SCH MMU: at 07:55

## 2019-02-19 RX ADMIN — HEPARIN SODIUM SCH UNIT: 5000 INJECTION, SOLUTION INTRAVENOUS; SUBCUTANEOUS at 20:12

## 2019-02-19 RX ADMIN — SIMETHICONE SCH MG: 20 SUSPENSION/ DROPS ORAL at 20:12

## 2019-02-19 RX ADMIN — Medication SCH MMU: at 20:08

## 2019-02-19 RX ADMIN — PANTOPRAZOLE SODIUM SCH MG: 40 TABLET, DELAYED RELEASE ORAL at 07:55

## 2019-02-19 RX ADMIN — LEVOTHYROXINE SODIUM SCH MCG: 100 TABLET ORAL at 07:55

## 2019-02-19 RX ADMIN — SIMETHICONE SCH MG: 20 SUSPENSION/ DROPS ORAL at 16:56

## 2019-02-19 RX ADMIN — SIMETHICONE SCH MG: 20 SUSPENSION/ DROPS ORAL at 07:57

## 2019-02-19 RX ADMIN — HEPARIN SODIUM SCH UNIT: 5000 INJECTION, SOLUTION INTRAVENOUS; SUBCUTANEOUS at 07:56

## 2019-02-19 NOTE — NUR
Problems reprioritized. Patient report given, questions answered & plan of care reviewed 
with NAZARIO Lopez.

## 2019-02-19 NOTE — NUR
Pt arrived on unit with ICU RN Amanuel. Pt was transferred to bed and had foam dressing applied 
to coccyx. Amanuel RN called report at 1725, was able to ask questions before patient arrived on 
unit. Will continue to monitor.

## 2019-02-19 NOTE — NUR
Called Dr. Hernandez with current lab report and no changes were ordered. I will continue to 
monitor with [facility] labs.

## 2019-02-19 NOTE — NUR
Patient in room PCU 3014. I have received report from NAZARIO Lopez and had the opportunity to 
ask questions and assume patient care. Woke patient up as dinner has arrived, she is fluid 
restricted to 2L a day due to electrolyte imbalance due to excessive water intake and 
anorexia. I sat up the patient's dinner tray and will continue to monitor.

## 2019-02-19 NOTE — NUR
Problems reprioritized. Patient report given, questions answered & plan of care reviewed 
with Tanya LANGE.Patient stable at transfer of care.

## 2019-02-19 NOTE — NUR
Problems reprioritized. Patient report given, questions answered & plan of care reviewed 
with geneva San.

## 2019-02-20 VITALS — DIASTOLIC BLOOD PRESSURE: 67 MMHG | SYSTOLIC BLOOD PRESSURE: 124 MMHG

## 2019-02-20 VITALS — DIASTOLIC BLOOD PRESSURE: 92 MMHG | SYSTOLIC BLOOD PRESSURE: 146 MMHG

## 2019-02-20 VITALS — SYSTOLIC BLOOD PRESSURE: 156 MMHG | DIASTOLIC BLOOD PRESSURE: 87 MMHG

## 2019-02-20 VITALS — DIASTOLIC BLOOD PRESSURE: 68 MMHG | SYSTOLIC BLOOD PRESSURE: 126 MMHG

## 2019-02-20 VITALS — DIASTOLIC BLOOD PRESSURE: 64 MMHG | SYSTOLIC BLOOD PRESSURE: 106 MMHG

## 2019-02-20 LAB
ALBUMIN SERPL BCP-MCNC: 2.5 G/DL (ref 3.4–5)
ALBUMIN SERPL BCP-MCNC: 2.6 G/DL (ref 3.4–5)
ALBUMIN SERPL BCP-MCNC: 2.6 G/DL (ref 3.4–5)
ALBUMIN SERPL BCP-MCNC: 2.8 G/DL (ref 3.4–5)
ANION GAP SERPL CALCULATED.3IONS-SCNC: 6 MMOL/L (ref 8–16)
ANION GAP SERPL CALCULATED.3IONS-SCNC: 7 MMOL/L (ref 8–16)
ANION GAP SERPL CALCULATED.3IONS-SCNC: 7 MMOL/L (ref 8–16)
ANION GAP SERPL CALCULATED.3IONS-SCNC: 8 MMOL/L (ref 8–16)
BASOPHILS # BLD AUTO: 0 X10'3 (ref 0–0.2)
BASOPHILS NFR BLD AUTO: 0.5 % (ref 0–1)
BUN SERPL-MCNC: 11 MG/DL (ref 7–18)
BUN SERPL-MCNC: 11 MG/DL (ref 7–18)
BUN SERPL-MCNC: 12 MG/DL (ref 7–18)
BUN SERPL-MCNC: 14 MG/DL (ref 7–18)
BUN/CREAT SERPL: 23.4 (ref 6.6–38)
BUN/CREAT SERPL: 24.4 (ref 6.6–38)
BUN/CREAT SERPL: 26.7 (ref 6.6–38)
BUN/CREAT SERPL: 31.1 (ref 6.6–38)
CALCIUM SERPL-MCNC: 8 MG/DL (ref 8.5–10.1)
CALCIUM SERPL-MCNC: 8.3 MG/DL (ref 8.5–10.1)
CHLORIDE SERPL-SCNC: 93 MMOL/L (ref 99–107)
CHLORIDE SERPL-SCNC: 95 MMOL/L (ref 99–107)
CHLORIDE SERPL-SCNC: 95 MMOL/L (ref 99–107)
CHLORIDE SERPL-SCNC: 97 MMOL/L (ref 99–107)
CO2 SERPL-SCNC: 24.2 MMOL/L (ref 24–32)
CO2 SERPL-SCNC: 25.5 MMOL/L (ref 24–32)
CO2 SERPL-SCNC: 26.2 MMOL/L (ref 24–32)
CO2 SERPL-SCNC: 26.3 MMOL/L (ref 24–32)
CREAT SERPL-MCNC: 0.45 MG/DL (ref 0.4–0.9)
CREAT SERPL-MCNC: 0.47 MG/DL (ref 0.4–0.9)
EOSINOPHIL # BLD AUTO: 0.1 X10'3 (ref 0–0.9)
EOSINOPHIL NFR BLD AUTO: 1.8 % (ref 0–6)
ERYTHROCYTE [DISTWIDTH] IN BLOOD BY AUTOMATED COUNT: 13.5 % (ref 11.5–14.5)
GFR SERPL CREATININE-BSD FRML MDRD: > 90 ML/MIN
GLUCOSE SERPL-MCNC: 109 MG/DL (ref 70–104)
GLUCOSE SERPL-MCNC: 120 MG/DL (ref 70–104)
GLUCOSE SERPL-MCNC: 69 MG/DL (ref 70–104)
GLUCOSE SERPL-MCNC: 95 MG/DL (ref 70–104)
HCT VFR BLD AUTO: 38.1 % (ref 35–45)
HGB BLD-MCNC: 12.9 G/DL (ref 12–16)
LYMPHOCYTES # BLD AUTO: 0.7 X10'3 (ref 1.1–4.8)
LYMPHOCYTES NFR BLD AUTO: 16.2 % (ref 21–51)
MCH RBC QN AUTO: 32.2 PG (ref 27–31)
MCHC RBC AUTO-ENTMCNC: 33.8 G/DL (ref 33–36.5)
MCV RBC AUTO: 95.2 FL (ref 78–98)
MONOCYTES # BLD AUTO: 0.2 X10'3 (ref 0–0.9)
MONOCYTES NFR BLD AUTO: 4.8 % (ref 2–12)
NEUTROPHILS # BLD AUTO: 3.5 X10'3 (ref 1.8–7.7)
NEUTROPHILS NFR BLD AUTO: 76.7 % (ref 42–75)
PLATELET # BLD AUTO: 148 X10'3 (ref 140–440)
PMV BLD AUTO: 9.5 FL (ref 7.4–10.4)
POTASSIUM SERPL-SCNC: 3.6 MMOL/L (ref 3.5–5.1)
POTASSIUM SERPL-SCNC: 3.8 MMOL/L (ref 3.5–5.1)
POTASSIUM SERPL-SCNC: 3.8 MMOL/L (ref 3.5–5.1)
POTASSIUM SERPL-SCNC: 4.3 MMOL/L (ref 3.5–5.1)
RBC # BLD AUTO: 4 X10'6 (ref 4.2–5.6)
SODIUM SERPL-SCNC: 126 MMOL/L (ref 135–145)
SODIUM SERPL-SCNC: 127 MMOL/L (ref 135–145)
SODIUM SERPL-SCNC: 127 MMOL/L (ref 135–145)
SODIUM SERPL-SCNC: 129 MMOL/L (ref 135–145)
WBC # BLD AUTO: 4.6 X10'3 (ref 4.5–11)

## 2019-02-20 RX ADMIN — SODIUM CHLORIDE SCH MLS/HR: 9 INJECTION INTRAMUSCULAR; INTRAVENOUS; SUBCUTANEOUS at 16:38

## 2019-02-20 RX ADMIN — HEPARIN SODIUM SCH UNIT: 5000 INJECTION, SOLUTION INTRAVENOUS; SUBCUTANEOUS at 20:25

## 2019-02-20 RX ADMIN — HEPARIN SODIUM SCH UNIT: 5000 INJECTION, SOLUTION INTRAVENOUS; SUBCUTANEOUS at 07:42

## 2019-02-20 RX ADMIN — PANTOPRAZOLE SODIUM SCH MG: 40 TABLET, DELAYED RELEASE ORAL at 07:42

## 2019-02-20 RX ADMIN — Medication SCH MMU: at 20:24

## 2019-02-20 RX ADMIN — SIMETHICONE SCH MG: 20 SUSPENSION/ DROPS ORAL at 08:00

## 2019-02-20 RX ADMIN — LEVOTHYROXINE SODIUM SCH MCG: 100 TABLET ORAL at 07:42

## 2019-02-20 RX ADMIN — Medication SCH MMU: at 07:42

## 2019-02-20 RX ADMIN — SIMETHICONE SCH MG: 20 SUSPENSION/ DROPS ORAL at 13:25

## 2019-02-20 RX ADMIN — SIMETHICONE SCH MG: 20 SUSPENSION/ DROPS ORAL at 20:24

## 2019-02-20 NOTE — NUR
Patient in room PCU 3014. I have received report from  Clare Gonzalez  and had the opportunity to 
ask questions and assume patient care.

## 2019-02-20 NOTE — NUR
TELEPSYCH REPORT GIVEN TO DR. LINARES. LAB DRAWING BLOOD FROM PATIENT AND REPORTS SKIN TEAR 
ON LEFT AC WHEN TAPE WAS REMOVED FROM OLD DRAW SITE. GAUZE DRESSING APPLIED OVER SKIN TEAR.

## 2019-02-20 NOTE — NUR
Patient in room PCU 3014. I have received report from NAZARIO MYERS  and had the opportunity to 
ask questions and assume patient care.

## 2019-02-20 NOTE — NUR
ADMINISTERING MORNING MEDICATIONS. BREAKFAST TRAY IN FRONT OF PATIENT, BUT SHE DOES NOT WANT 
COVER REMOVED FROM FOOD AND IS DRINKING COFFEE. MEDS GIVEN AND PATIENT REQUESTING MORE 
COFFEE. PATIENT INSTRUCTED TO EAT BREAKFAST AND THEN SHE CAN RECEIVE ADDITIONAL COFFEE. DOES 
NOT APPEAR INTERESTED IN FOOD AND STATES SHE HOPES SHE CAN BE DISCHARGED TODAY.

## 2019-02-20 NOTE — NUR
Problems reprioritized. Patient report given, questions answered & plan of care reviewed 
with NAZARIO GATES.

## 2019-02-20 NOTE — NUR
TC FROM PSYCHIATRIST FROM SPECIALIST ON CALL TO GET HISTORY AND CONDITION REPORT ABOUT 
PATIENT. STATES SHE WILL BE CONSULTING WITH PATIENT IN A FEW MINUTES. CAMERA #1 IS SET UP IN 
THE ROOM.

## 2019-02-21 VITALS — DIASTOLIC BLOOD PRESSURE: 70 MMHG | SYSTOLIC BLOOD PRESSURE: 136 MMHG

## 2019-02-21 VITALS — SYSTOLIC BLOOD PRESSURE: 144 MMHG | DIASTOLIC BLOOD PRESSURE: 74 MMHG

## 2019-02-21 VITALS — SYSTOLIC BLOOD PRESSURE: 61 MMHG | DIASTOLIC BLOOD PRESSURE: 39 MMHG

## 2019-02-21 VITALS — DIASTOLIC BLOOD PRESSURE: 83 MMHG | SYSTOLIC BLOOD PRESSURE: 144 MMHG

## 2019-02-21 VITALS — SYSTOLIC BLOOD PRESSURE: 149 MMHG | DIASTOLIC BLOOD PRESSURE: 85 MMHG

## 2019-02-21 VITALS — DIASTOLIC BLOOD PRESSURE: 80 MMHG | SYSTOLIC BLOOD PRESSURE: 147 MMHG

## 2019-02-21 VITALS — SYSTOLIC BLOOD PRESSURE: 152 MMHG | DIASTOLIC BLOOD PRESSURE: 77 MMHG

## 2019-02-21 VITALS — DIASTOLIC BLOOD PRESSURE: 74 MMHG | SYSTOLIC BLOOD PRESSURE: 152 MMHG

## 2019-02-21 VITALS — DIASTOLIC BLOOD PRESSURE: 77 MMHG | SYSTOLIC BLOOD PRESSURE: 133 MMHG

## 2019-02-21 VITALS — DIASTOLIC BLOOD PRESSURE: 75 MMHG | SYSTOLIC BLOOD PRESSURE: 138 MMHG

## 2019-02-21 VITALS — DIASTOLIC BLOOD PRESSURE: 70 MMHG | SYSTOLIC BLOOD PRESSURE: 124 MMHG

## 2019-02-21 LAB
ALBUMIN SERPL BCP-MCNC: 2.8 G/DL (ref 3.4–5)
ALBUMIN/GLOB SERPL: 0.8 {RATIO} (ref 1.1–1.5)
ALP SERPL-CCNC: 95 IU/L (ref 46–116)
ALT SERPL W P-5'-P-CCNC: 189 U/L (ref 12–78)
ANION GAP SERPL CALCULATED.3IONS-SCNC: 7 MMOL/L (ref 8–16)
AST SERPL W P-5'-P-CCNC: 309 U/L (ref 10–37)
BASOPHILS # BLD AUTO: 0 X10'3 (ref 0–0.2)
BASOPHILS NFR BLD AUTO: 1.1 % (ref 0–1)
BILIRUB SERPL-MCNC: 0.7 MG/DL (ref 0.1–1)
BUN SERPL-MCNC: 13 MG/DL (ref 7–18)
BUN/CREAT SERPL: 22.8 (ref 6.6–38)
CALCIUM SERPL-MCNC: 8.3 MG/DL (ref 8.5–10.1)
CHLORIDE SERPL-SCNC: 95 MMOL/L (ref 99–107)
CO2 SERPL-SCNC: 26.1 MMOL/L (ref 24–32)
CREAT SERPL-MCNC: 0.57 MG/DL (ref 0.4–0.9)
EOSINOPHIL # BLD AUTO: 0.1 X10'3 (ref 0–0.9)
EOSINOPHIL NFR BLD AUTO: 1.9 % (ref 0–6)
ERYTHROCYTE [DISTWIDTH] IN BLOOD BY AUTOMATED COUNT: 13.1 % (ref 11.5–14.5)
GFR SERPL CREATININE-BSD FRML MDRD: > 90 ML/MIN
GLUCOSE SERPL-MCNC: 98 MG/DL (ref 70–104)
HCT VFR BLD AUTO: 39.4 % (ref 35–45)
HEMOCCULT STL QL: NEGATIVE
HGB BLD-MCNC: 13.4 G/DL (ref 12–16)
LYMPHOCYTES # BLD AUTO: 1 X10'3 (ref 1.1–4.8)
LYMPHOCYTES NFR BLD AUTO: 23.2 % (ref 21–51)
MCH RBC QN AUTO: 32.2 PG (ref 27–31)
MCHC RBC AUTO-ENTMCNC: 34.1 G/DL (ref 33–36.5)
MCV RBC AUTO: 94.6 FL (ref 78–98)
MONOCYTES # BLD AUTO: 0.4 X10'3 (ref 0–0.9)
MONOCYTES NFR BLD AUTO: 9.5 % (ref 2–12)
NEUTROPHILS # BLD AUTO: 2.7 X10'3 (ref 1.8–7.7)
NEUTROPHILS NFR BLD AUTO: 64.3 % (ref 42–75)
PLATELET # BLD AUTO: 145 X10'3 (ref 140–440)
PMV BLD AUTO: 9.7 FL (ref 7.4–10.4)
POTASSIUM SERPL-SCNC: 4.3 MMOL/L (ref 3.5–5.1)
PROT SERPL-MCNC: 6.2 G/DL (ref 6.4–8.2)
RBC # BLD AUTO: 4.17 X10'6 (ref 4.2–5.6)
SODIUM SERPL-SCNC: 128 MMOL/L (ref 135–145)
WBC # BLD AUTO: 4.3 X10'3 (ref 4.5–11)

## 2019-02-21 RX ADMIN — LEVOTHYROXINE SODIUM SCH MCG: 100 TABLET ORAL at 07:21

## 2019-02-21 RX ADMIN — HEPARIN SODIUM SCH UNIT: 5000 INJECTION, SOLUTION INTRAVENOUS; SUBCUTANEOUS at 07:22

## 2019-02-21 RX ADMIN — SIMETHICONE SCH MG: 20 SUSPENSION/ DROPS ORAL at 07:23

## 2019-02-21 RX ADMIN — Medication SCH MMU: at 20:49

## 2019-02-21 RX ADMIN — SIMETHICONE SCH MG: 20 SUSPENSION/ DROPS ORAL at 12:44

## 2019-02-21 RX ADMIN — HEPARIN SODIUM SCH UNIT: 5000 INJECTION, SOLUTION INTRAVENOUS; SUBCUTANEOUS at 20:52

## 2019-02-21 RX ADMIN — SODIUM CHLORIDE SCH MLS/HR: 9 INJECTION INTRAMUSCULAR; INTRAVENOUS; SUBCUTANEOUS at 11:44

## 2019-02-21 RX ADMIN — SIMETHICONE SCH MG: 20 SUSPENSION/ DROPS ORAL at 20:52

## 2019-02-21 RX ADMIN — PANTOPRAZOLE SODIUM SCH MG: 40 TABLET, DELAYED RELEASE ORAL at 07:21

## 2019-02-21 RX ADMIN — Medication SCH MMU: at 07:21

## 2019-02-21 NOTE — NUR
Patient in room PCU 3014. I have received report from NAZARIO rUban   and had the opportunity 
to ask questions and assume patient care.

## 2019-02-21 NOTE — NUR
Initial: Pt admit with hyponatremia and metabolic encephalopathy. Current 

mentation improved and likely close to baseline per MD progress notes. Na 

105 at admit, currently remains low at 127, currently receiving NS at 50 

mL/hr. Pt on a regular diet with a 1800 mL fluid restriction. Documented 

PO intake averaging 25% however up to 50% at breakfast this AM. Pt seen at 

bedside. Per MD progress notes pt states a low appetite however pt 

endorses a good appetite to RD and states she is "starving". Pt denies any 

difficulty chewing/swallowing or food allergies. Food preferences obtained 

for next meal and d/w dietary. LBM 2/13, pt currently not on any bowel 

care. Pt states she is frequently with constipation and takes Miralax at 

home, pt agreeable with prunes to aid in BM. Pt states she follows a low 

fiber diet at home to help manage constipation, informed pt on the 

importance on fiber for bowel regularity. Pt given written and verbal 

constipation nutrition therapy education and RD contact information. Will 

continue to follow.

Recommendations:

1) Continue with regular diet with fluid restrict per MD

2) Encourage PO intake; honor food preferences within diet restrictions

3) Monitor need for additional bowel care

4) Wt per rx

-------------------------------------------------------------------------------

Addendum: 02/21/19 at 1450 by Lupe Valdes RD

-------------------------------------------------------------------------------

Amended: Links added.

## 2019-02-21 NOTE — NUR
Rapid called at 1515 and Dr Hernandez called after.  SBP dropped to 69 and HR was at 37.  
Bolus was of 1L of NS was started per Dr Hernandez and patient was accuchecked at 91.  Twelve 
lead done and stool sample was collected.  Patient was found to a have a large BM.  After BM 
and 250cc of bolus completed SBP was back over 100 and heart rate in the 50's.  Patient was 
cleaned up and transported to the CICU.

## 2019-02-21 NOTE — NUR
Problems reprioritized. Patient report given, questions answered & plan of care reviewed 
with NAZARIO Rodriguez.

## 2019-02-21 NOTE — NUR
Patient in room CICU 2015. I have received report  and had the opportunity to ask questions 
and assume patient care.

## 2019-02-21 NOTE — NUR
Pt arrived to unit and transferred to ICU bed. Report received from NAZARIO Kyle. Pt alert 
and responsive, VSS. NS bolus finishing up. Weller catheter placed per MD orders. Will 
continue to monitor

## 2019-02-21 NOTE — NUR
Student Medication Administration:

For this medication-pass time frame, all medication were reviewed, dispensed, administered 
and documented per hospital policy by SN Elizabeth.

## 2019-02-22 VITALS — DIASTOLIC BLOOD PRESSURE: 71 MMHG | SYSTOLIC BLOOD PRESSURE: 141 MMHG

## 2019-02-22 VITALS — SYSTOLIC BLOOD PRESSURE: 137 MMHG | DIASTOLIC BLOOD PRESSURE: 66 MMHG

## 2019-02-22 VITALS — SYSTOLIC BLOOD PRESSURE: 127 MMHG | DIASTOLIC BLOOD PRESSURE: 84 MMHG

## 2019-02-22 VITALS — DIASTOLIC BLOOD PRESSURE: 74 MMHG | SYSTOLIC BLOOD PRESSURE: 133 MMHG

## 2019-02-22 VITALS — DIASTOLIC BLOOD PRESSURE: 78 MMHG | SYSTOLIC BLOOD PRESSURE: 133 MMHG

## 2019-02-22 VITALS — SYSTOLIC BLOOD PRESSURE: 135 MMHG | DIASTOLIC BLOOD PRESSURE: 74 MMHG

## 2019-02-22 VITALS — SYSTOLIC BLOOD PRESSURE: 105 MMHG | DIASTOLIC BLOOD PRESSURE: 52 MMHG

## 2019-02-22 VITALS — SYSTOLIC BLOOD PRESSURE: 130 MMHG | DIASTOLIC BLOOD PRESSURE: 67 MMHG

## 2019-02-22 VITALS — DIASTOLIC BLOOD PRESSURE: 76 MMHG | SYSTOLIC BLOOD PRESSURE: 144 MMHG

## 2019-02-22 VITALS — SYSTOLIC BLOOD PRESSURE: 141 MMHG | DIASTOLIC BLOOD PRESSURE: 69 MMHG

## 2019-02-22 VITALS — DIASTOLIC BLOOD PRESSURE: 82 MMHG | SYSTOLIC BLOOD PRESSURE: 163 MMHG

## 2019-02-22 VITALS — DIASTOLIC BLOOD PRESSURE: 84 MMHG | SYSTOLIC BLOOD PRESSURE: 147 MMHG

## 2019-02-22 VITALS — DIASTOLIC BLOOD PRESSURE: 73 MMHG | SYSTOLIC BLOOD PRESSURE: 139 MMHG

## 2019-02-22 VITALS — SYSTOLIC BLOOD PRESSURE: 131 MMHG | DIASTOLIC BLOOD PRESSURE: 69 MMHG

## 2019-02-22 LAB
ALBUMIN SERPL BCP-MCNC: 2.7 G/DL (ref 3.4–5)
ANION GAP SERPL CALCULATED.3IONS-SCNC: 7 MMOL/L (ref 8–16)
BASOPHILS # BLD AUTO: 0 X10'3 (ref 0–0.2)
BASOPHILS NFR BLD AUTO: 0.8 % (ref 0–1)
BUN SERPL-MCNC: 12 MG/DL (ref 7–18)
BUN/CREAT SERPL: 24.5 (ref 6.6–38)
CALCIUM SERPL-MCNC: 7.9 MG/DL (ref 8.5–10.1)
CHLORIDE SERPL-SCNC: 98 MMOL/L (ref 99–107)
CO2 SERPL-SCNC: 26.9 MMOL/L (ref 24–32)
CREAT SERPL-MCNC: 0.49 MG/DL (ref 0.4–0.9)
EOSINOPHIL # BLD AUTO: 0.1 X10'3 (ref 0–0.9)
EOSINOPHIL NFR BLD AUTO: 1.5 % (ref 0–6)
ERYTHROCYTE [DISTWIDTH] IN BLOOD BY AUTOMATED COUNT: 13.3 % (ref 11.5–14.5)
GFR SERPL CREATININE-BSD FRML MDRD: > 90 ML/MIN
GLUCOSE SERPL-MCNC: 79 MG/DL (ref 70–104)
HCT VFR BLD AUTO: 41.5 % (ref 35–45)
HGB BLD-MCNC: 14.1 G/DL (ref 12–16)
LYMPHOCYTES # BLD AUTO: 0.8 X10'3 (ref 1.1–4.8)
LYMPHOCYTES NFR BLD AUTO: 21.3 % (ref 21–51)
MCH RBC QN AUTO: 32.4 PG (ref 27–31)
MCHC RBC AUTO-ENTMCNC: 33.9 G/DL (ref 33–36.5)
MCV RBC AUTO: 95.6 FL (ref 78–98)
MONOCYTES # BLD AUTO: 0.3 X10'3 (ref 0–0.9)
MONOCYTES NFR BLD AUTO: 7.2 % (ref 2–12)
NEUTROPHILS # BLD AUTO: 2.6 X10'3 (ref 1.8–7.7)
NEUTROPHILS NFR BLD AUTO: 69.2 % (ref 42–75)
OSMOLALITY UR: 354 MOSM/K (ref 50–1400)
PLATELET # BLD AUTO: 165 X10'3 (ref 140–440)
PMV BLD AUTO: 9.1 FL (ref 7.4–10.4)
POTASSIUM SERPL-SCNC: 4 MMOL/L (ref 3.5–5.1)
RBC # BLD AUTO: 4.34 X10'6 (ref 4.2–5.6)
SODIUM ?TM SUB UR QN: 92 MEQ/L
SODIUM SERPL-SCNC: 132 MMOL/L (ref 135–145)
WBC # BLD AUTO: 3.7 X10'3 (ref 4.5–11)

## 2019-02-22 RX ADMIN — SIMETHICONE SCH MG: 20 SUSPENSION/ DROPS ORAL at 13:31

## 2019-02-22 RX ADMIN — HEPARIN SODIUM SCH UNIT: 5000 INJECTION, SOLUTION INTRAVENOUS; SUBCUTANEOUS at 08:19

## 2019-02-22 RX ADMIN — LEVOTHYROXINE SODIUM SCH MCG: 100 TABLET ORAL at 08:19

## 2019-02-22 RX ADMIN — SODIUM CHLORIDE SCH MLS/HR: 9 INJECTION INTRAMUSCULAR; INTRAVENOUS; SUBCUTANEOUS at 11:43

## 2019-02-22 RX ADMIN — PANTOPRAZOLE SODIUM SCH MG: 40 TABLET, DELAYED RELEASE ORAL at 08:19

## 2019-02-22 RX ADMIN — SIMETHICONE SCH MG: 20 SUSPENSION/ DROPS ORAL at 08:27

## 2019-02-22 RX ADMIN — Medication SCH MMU: at 08:19

## 2019-02-22 RX ADMIN — Medication PRN G: at 08:21

## 2019-02-22 RX ADMIN — SODIUM CHLORIDE SCH MLS/HR: 9 INJECTION INTRAMUSCULAR; INTRAVENOUS; SUBCUTANEOUS at 07:34

## 2019-02-22 NOTE — NUR
Patient in room CICU 2015. I have received report from NAZARIO Rodriguez and had the opportunity to 
ask questions and assume patient care.

## 2019-02-22 NOTE — NUR
pt refused to have humphrey d/c x3. educated the pt on why the humphrey needs to be removed. pt 
continues to refuse. will continue to monitor

## 2019-02-22 NOTE — NUR
Pt wheeled down to lobby by PCT accompanied by . Pt has all belongings. Stated she 
will follow up with Dr. Hernandez within one month. [facility] orders given to pt's  
with understanding that BMP needs to be drawn weekly starting 3/1. Pt alert, oriented, and 
stable for discharge. Monitor leads and IV removed with catheter intact.

## 2019-02-22 NOTE — NUR
Pt's am blood sugar 54. Gave pt oral dextrose to drink and explained risks of low blood 
sugar. Pt took one sip of oral dextrose and poured the rest of it on floor stating she" 
can't drink any more." Gave pt breakfast. Will reassess blood sugar once pt is done eating

## 2020-11-16 NOTE — NUR
Occupational Therapy Patient is currently off of the floor and unavailable for OT treatment. Discussed patient with CM who confirmed that the new plan is for patient to discharge home tomorrow with hospice. Will complete the OT order and sign off. Problems reprioritized. Patient report given, questions answered & plan of care reviewed 
with Saroj LANGE. 36.9